# Patient Record
Sex: MALE | Race: WHITE | NOT HISPANIC OR LATINO | Employment: FULL TIME | URBAN - NONMETROPOLITAN AREA
[De-identification: names, ages, dates, MRNs, and addresses within clinical notes are randomized per-mention and may not be internally consistent; named-entity substitution may affect disease eponyms.]

---

## 2023-01-08 ENCOUNTER — APPOINTMENT (EMERGENCY)
Dept: RADIOLOGY | Facility: HOSPITAL | Age: 38
End: 2023-01-08

## 2023-01-08 ENCOUNTER — HOSPITAL ENCOUNTER (EMERGENCY)
Facility: HOSPITAL | Age: 38
Discharge: HOME | End: 2023-01-08
Attending: EMERGENCY MEDICINE

## 2023-01-08 ENCOUNTER — APPOINTMENT (EMERGENCY)
Dept: CT IMAGING | Facility: HOSPITAL | Age: 38
End: 2023-01-08

## 2023-01-08 VITALS
RESPIRATION RATE: 20 BRPM | HEART RATE: 100 BPM | DIASTOLIC BLOOD PRESSURE: 74 MMHG | TEMPERATURE: 96.2 F | SYSTOLIC BLOOD PRESSURE: 115 MMHG | OXYGEN SATURATION: 97 % | WEIGHT: 202.82 LBS

## 2023-01-08 DIAGNOSIS — R55 SYNCOPE: Primary | ICD-10-CM

## 2023-01-08 DIAGNOSIS — F10.10 ALCOHOL ABUSE: ICD-10-CM

## 2023-01-08 LAB
ALBUMIN SERPL BCP-MCNC: 3.9 G/DL (ref 3.5–5)
ALP SERPL-CCNC: 73 U/L (ref 46–116)
ALT SERPL W P-5'-P-CCNC: 127 U/L (ref 12–78)
AMPHETAMINES SERPL QL SCN: NEGATIVE
ANION GAP SERPL CALCULATED.3IONS-SCNC: 13 MMOL/L (ref 4–13)
APTT PPP: 21 SECONDS (ref 23–37)
AST SERPL W P-5'-P-CCNC: 51 U/L (ref 5–45)
ATRIAL RATE: 91 BPM
BARBITURATES UR QL: NEGATIVE
BASOPHILS # BLD AUTO: 0.07 THOUSANDS/ÂΜL (ref 0–0.1)
BASOPHILS NFR BLD AUTO: 1 % (ref 0–1)
BENZODIAZ UR QL: NEGATIVE
BILIRUB SERPL-MCNC: 0.53 MG/DL (ref 0.2–1)
BILIRUB UR QL STRIP: NEGATIVE
BUN SERPL-MCNC: 12 MG/DL (ref 5–25)
CALCIUM SERPL-MCNC: 8.3 MG/DL (ref 8.3–10.1)
CARDIAC TROPONIN I PNL SERPL HS: <2 NG/L
CHLORIDE SERPL-SCNC: 103 MMOL/L (ref 96–108)
CLARITY UR: CLEAR
CO2 SERPL-SCNC: 22 MMOL/L (ref 21–32)
COCAINE UR QL: NEGATIVE
COLOR UR: NORMAL
CREAT SERPL-MCNC: 1.28 MG/DL (ref 0.6–1.3)
D DIMER PPP FEU-MCNC: <0.27 UG/ML FEU
EOSINOPHIL # BLD AUTO: 0.09 THOUSAND/ÂΜL (ref 0–0.61)
EOSINOPHIL NFR BLD AUTO: 1 % (ref 0–6)
ERYTHROCYTE [DISTWIDTH] IN BLOOD BY AUTOMATED COUNT: 11.9 % (ref 11.6–15.1)
ETHANOL SERPL-MCNC: 153 MG/DL (ref 0–3)
FLUAV RNA RESP QL NAA+PROBE: NEGATIVE
FLUBV RNA RESP QL NAA+PROBE: NEGATIVE
GFR SERPL CREATININE-BSD FRML MDRD: 71 ML/MIN/1.73SQ M
GLUCOSE SERPL-MCNC: 144 MG/DL (ref 65–140)
GLUCOSE SERPL-MCNC: 162 MG/DL (ref 65–140)
GLUCOSE UR STRIP-MCNC: NEGATIVE MG/DL
HCT VFR BLD AUTO: 43.9 % (ref 36.5–49.3)
HGB BLD-MCNC: 15.5 G/DL (ref 12–17)
HGB UR QL STRIP.AUTO: NEGATIVE
IMM GRANULOCYTES # BLD AUTO: 0.1 THOUSAND/UL (ref 0–0.2)
IMM GRANULOCYTES NFR BLD AUTO: 1 % (ref 0–2)
INR PPP: 0.99 (ref 0.84–1.19)
KETONES UR STRIP-MCNC: NEGATIVE MG/DL
LACTATE SERPL-SCNC: 2.2 MMOL/L (ref 0.5–2)
LACTATE SERPL-SCNC: 3.2 MMOL/L (ref 0.5–2)
LEUKOCYTE ESTERASE UR QL STRIP: NEGATIVE
LIPASE SERPL-CCNC: 194 U/L (ref 73–393)
LYMPHOCYTES # BLD AUTO: 4.63 THOUSANDS/ÂΜL (ref 0.6–4.47)
LYMPHOCYTES NFR BLD AUTO: 47 % (ref 14–44)
MCH RBC QN AUTO: 32.4 PG (ref 26.8–34.3)
MCHC RBC AUTO-ENTMCNC: 35.3 G/DL (ref 31.4–37.4)
MCV RBC AUTO: 92 FL (ref 82–98)
METHADONE UR QL: NEGATIVE
MONOCYTES # BLD AUTO: 0.77 THOUSAND/ÂΜL (ref 0.17–1.22)
MONOCYTES NFR BLD AUTO: 8 % (ref 4–12)
NEUTROPHILS # BLD AUTO: 4 THOUSANDS/ÂΜL (ref 1.85–7.62)
NEUTS SEG NFR BLD AUTO: 42 % (ref 43–75)
NITRITE UR QL STRIP: NEGATIVE
NRBC BLD AUTO-RTO: 0 /100 WBCS
OPIATES UR QL SCN: NEGATIVE
OXYCODONE+OXYMORPHONE UR QL SCN: NEGATIVE
P AXIS: 33 DEGREES
PCP UR QL: NEGATIVE
PH UR STRIP.AUTO: 6 [PH]
PLATELET # BLD AUTO: 286 THOUSANDS/UL (ref 149–390)
PMV BLD AUTO: 8.8 FL (ref 8.9–12.7)
POTASSIUM SERPL-SCNC: 3.5 MMOL/L (ref 3.5–5.3)
PR INTERVAL: 166 MS
PROT SERPL-MCNC: 6.9 G/DL (ref 6.4–8.4)
PROT UR STRIP-MCNC: NEGATIVE MG/DL
PROTHROMBIN TIME: 13.2 SECONDS (ref 11.6–14.5)
QRS AXIS: 58 DEGREES
QRSD INTERVAL: 92 MS
QT INTERVAL: 372 MS
QTC INTERVAL: 457 MS
RBC # BLD AUTO: 4.79 MILLION/UL (ref 3.88–5.62)
RSV RNA RESP QL NAA+PROBE: NEGATIVE
SARS-COV-2 RNA RESP QL NAA+PROBE: NEGATIVE
SODIUM SERPL-SCNC: 138 MMOL/L (ref 135–147)
SP GR UR STRIP.AUTO: <1.005 (ref 1–1.03)
T WAVE AXIS: 28 DEGREES
THC UR QL: POSITIVE
UROBILINOGEN UR QL STRIP.AUTO: 0.2 E.U./DL
VENTRICULAR RATE: 91 BPM
WBC # BLD AUTO: 9.66 THOUSAND/UL (ref 4.31–10.16)

## 2023-01-08 RX ADMIN — SODIUM CHLORIDE 1000 ML: 0.9 INJECTION, SOLUTION INTRAVENOUS at 19:16

## 2023-01-08 RX ADMIN — SODIUM CHLORIDE 1000 ML: 0.9 INJECTION, SOLUTION INTRAVENOUS at 20:19

## 2023-01-08 NOTE — Clinical Note
Mechelle Fung was seen and treated in our emergency department on 1/8/2023  Diagnosis:     Tammy Vo  may return to work on return date  He may return on this date: 01/10/2023         If you have any questions or concerns, please don't hesitate to call        Mai Clark MD    ______________________________           _______________          _______________  Hospital Representative                              Date                                Time

## 2023-01-09 NOTE — ED PROVIDER NOTES
History  Chief Complaint   Patient presents with   • Syncope     Pt reports drinking an unknown quantity of alcohol this afternoon  Witnessed syncopal event at the dinner table tonight  Lethargic, denies pain or injury      Brought in by ambulance  Had a syncopal episode  Admits to drinking beer today  States he only had 3 or 4  Admits to marijuana use  Feels like everything is heavy throughout his body  States he was at the dinner table when he suddenly got nauseated  Got warm and sweaty  Had a brief syncopal episode  No loss of bladder or bowel functions  No headaches  No chest pain  No shortness of breath  No abdominal pain or back pain  No palpitations   used: No    Syncope  Episode history:  Single  Most recent episode: Today  Timing:  Constant  Progression:  Resolved  Chronicity:  New  Context: sitting down    Context: not bowel movement, not dehydration and not medication change    Relieved by:  Nothing  Worsened by:  Nothing  Ineffective treatments:  None tried  Associated symptoms: nausea and weakness    Associated symptoms: no anxiety, no chest pain, no difficulty breathing, no dizziness, no fever, no headaches, no palpitations, no recent fall, no rectal bleeding, no seizures, no shortness of breath, no visual change and no vomiting        None       History reviewed  No pertinent past medical history  History reviewed  No pertinent surgical history  History reviewed  No pertinent family history  I have reviewed and agree with the history as documented  E-Cigarette/Vaping     E-Cigarette/Vaping Substances     Social History     Tobacco Use   • Smoking status: Some Days     Types: Cigars   • Smokeless tobacco: Never   Substance Use Topics   • Alcohol use: Yes   • Drug use: Yes     Types: Marijuana       Review of Systems   Constitutional: Negative for chills and fever     HENT: Negative for ear pain, hearing loss, sore throat, trouble swallowing and voice change  Eyes: Negative for pain and discharge  Respiratory: Negative for cough, shortness of breath and wheezing  Cardiovascular: Positive for syncope  Negative for chest pain and palpitations  Gastrointestinal: Positive for nausea  Negative for abdominal pain, blood in stool, constipation, diarrhea and vomiting  Genitourinary: Negative for dysuria, flank pain, frequency and hematuria  Musculoskeletal: Negative for joint swelling, neck pain and neck stiffness  Skin: Negative for rash and wound  Neurological: Positive for weakness  Negative for dizziness, seizures, syncope, facial asymmetry and headaches  Psychiatric/Behavioral: Negative for hallucinations, self-injury and suicidal ideas  All other systems reviewed and are negative  Physical Exam  Physical Exam  Vitals and nursing note reviewed  Constitutional:       General: He is not in acute distress  Appearance: He is well-developed  HENT:      Head: Normocephalic and atraumatic  Right Ear: External ear normal       Left Ear: External ear normal    Eyes:      General: No scleral icterus  Right eye: No discharge  Left eye: No discharge  Extraocular Movements: Extraocular movements intact  Conjunctiva/sclera: Conjunctivae normal    Cardiovascular:      Rate and Rhythm: Normal rate and regular rhythm  Heart sounds: Normal heart sounds  No murmur heard  Pulmonary:      Effort: Pulmonary effort is normal       Breath sounds: Normal breath sounds  No wheezing or rales  Abdominal:      General: Bowel sounds are normal  There is no distension  Palpations: Abdomen is soft  Tenderness: There is no abdominal tenderness  There is no guarding or rebound  Musculoskeletal:         General: No deformity  Normal range of motion  Cervical back: Normal range of motion and neck supple  Skin:     General: Skin is warm and dry  Findings: No rash     Neurological:      General: No focal deficit present  Mental Status: He is alert and oriented to person, place, and time  Cranial Nerves: No cranial nerve deficit  Psychiatric:         Mood and Affect: Mood normal          Behavior: Behavior normal          Thought Content: Thought content normal          Judgment: Judgment normal          Vital Signs  ED Triage Vitals   Temperature Pulse Respirations Blood Pressure SpO2   01/08/23 1903 01/08/23 1906 01/08/23 1907 01/08/23 1903 01/08/23 1906   (!) 96 2 °F (35 7 °C) 93 14 116/73 94 %      Temp Source Heart Rate Source Patient Position - Orthostatic VS BP Location FiO2 (%)   01/08/23 1903 -- 01/08/23 1903 01/08/23 1903 --   Temporal  Lying Right arm       Pain Score       01/08/23 1903       No Pain           Vitals:    01/08/23 1903 01/08/23 1906 01/08/23 2000 01/08/23 2100   BP: 116/73  104/62 114/73   Pulse:  93 87 98   Patient Position - Orthostatic VS: Lying  Lying Lying         Visual Acuity  Visual Acuity    Flowsheet Row Most Recent Value   L Pupil Size (mm) 3   R Pupil Size (mm) 3          ED Medications  Medications   sodium chloride 0 9 % bolus 1,000 mL (0 mL Intravenous Stopped 1/8/23 2018)   sodium chloride 0 9 % bolus 1,000 mL (1,000 mL Intravenous New Bag 1/8/23 2019)       Diagnostic Studies  Results Reviewed     Procedure Component Value Units Date/Time    Lactic acid 2 Hours [[id number]]  (Abnormal) Collected: 01/08/23 2133    Lab Status: Final result Specimen: Blood from Arm, Left Updated: 01/08/23 2208     LACTIC ACID 2 2 mmol/L     Narrative:      Result may be elevated if tourniquet was used during collection      Rapid drug screen, urine [[id number]]  (Abnormal) Collected: 01/08/23 2133    Lab Status: Final result Specimen: Urine, Clean Catch Updated: 01/08/23 2200     Amph/Meth UR Negative     Barbiturate Ur Negative     Benzodiazepine Urine Negative     Cocaine Urine Negative     Methadone Urine Negative     Opiate Urine Negative     PCP Ur Negative     THC Urine Positive     Oxycodone Urine Negative    Narrative:      Presumptive report  If requested, specimen will be sent to reference lab for confirmation  FOR MEDICAL PURPOSES ONLY  IF CONFIRMATION NEEDED PLEASE CONTACT THE LAB WITHIN 5 DAYS      Drug Screen Cutoff Levels:  AMPHETAMINE/METHAMPHETAMINES  1000 ng/mL  BARBITURATES     200 ng/mL  BENZODIAZEPINES     200 ng/mL  COCAINE      300 ng/mL  METHADONE      300 ng/mL  OPIATES      300 ng/mL  PHENCYCLIDINE     25 ng/mL  THC       50 ng/mL  OXYCODONE      100 ng/mL    UA w Reflex to Microscopic w Reflex to Culture [[id number]] Collected: 01/08/23 2134    Lab Status: No result Specimen: Urine, Clean Catch     Comprehensive metabolic panel [[id number]]  (Abnormal) Collected: 01/08/23 1912    Lab Status: Final result Specimen: Blood from Arm, Left Updated: 01/08/23 2020     Sodium 138 mmol/L      Potassium 3 5 mmol/L      Chloride 103 mmol/L      CO2 22 mmol/L      ANION GAP 13 mmol/L      BUN 12 mg/dL      Creatinine 1 28 mg/dL      Glucose 162 mg/dL      Calcium 8 3 mg/dL      AST 51 U/L       U/L      Alkaline Phosphatase 73 U/L      Total Protein 6 9 g/dL      Albumin 3 9 g/dL      Total Bilirubin 0 53 mg/dL      eGFR 71 ml/min/1 73sq m     Narrative:      [place] guidelines for Chronic Kidney Disease (CKD):   •  Stage 1 with normal or high GFR (GFR > 90 mL/min/1 73 square meters)  •  Stage 2 Mild CKD (GFR = 60-89 mL/min/1 73 square meters)  •  Stage 3A Moderate CKD (GFR = 45-59 mL/min/1 73 square meters)  •  Stage 3B Moderate CKD (GFR = 30-44 mL/min/1 73 square meters)  •  Stage 4 Severe CKD (GFR = 15-29 mL/min/1 73 square meters)  •  Stage 5 End Stage CKD (GFR <15 mL/min/1 73 square meters)  Note: GFR calculation is accurate only with a steady state creatinine    Lipase [[id number]]  (Normal) Collected: 01/08/23 1912    Lab Status: Final result Specimen: Blood from Arm, Left Updated: 01/08/23 2020     Lipase 194 u/L     Ethanol [[id number]]  (Abnormal) Collected: 01/08/23 1912    Lab Status: Final result Specimen: Blood from Arm, Left Updated: 01/08/23 2020     Ethanol Lvl 153 mg/dL     FLU/RSV/COVID - if FLU/RSV clinically relevant [[id number]]  (Normal) Collected: 01/08/23 1912    Lab Status: Final result Specimen: Nares from Nose Updated: 01/08/23 2003     SARS-CoV-2 Negative     INFLUENZA A PCR Negative     INFLUENZA B PCR Negative     RSV PCR Negative    Narrative:      FOR PEDIATRIC PATIENTS - copy/paste COVID Guidelines URL to browser: [email or web address]  ticcklex    SARS-CoV-2 assay is a Nucleic Acid Amplification assay intended for the  qualitative detection of nucleic acid from SARS-CoV-2 in nasopharyngeal  swabs  Results are for the presumptive identification of SARS-CoV-2 RNA  Positive results are indicative of infection with SARS-CoV-2, the virus  causing COVID-19, but do not rule out bacterial infection or co-infection  with other viruses  Laboratories within the [place] and its  territories are required to report all positive results to the appropriate  public health authorities  Negative results do not preclude SARS-CoV-2  infection and should not be used as the sole basis for treatment or other  patient management decisions  Negative results must be combined with  clinical observations, patient history, and epidemiological information  This test has not been FDA cleared or approved  This test has been authorized by FDA under an Emergency Use Authorization  (EUA)  This test is only authorized for the duration of time the  declaration that circumstances exist justifying the authorization of the  emergency use of an in vitro diagnostic tests for detection of SARS-CoV-2  virus and/or diagnosis of COVID-19 infection under section 564(b)(1) of  the Act, 21 U  S C  517CNJ-8(F)(3), unless the authorization is terminated  or revoked sooner   The test has been validated but independent review by FDA  and CLIA is pending  Test performed using [employer] GeneXpert: This RT-PCR assay targets N2,  a region unique to SARS-CoV-2  A conserved region in the E-gene was chosen  for pan-Sarbecovirus detection which includes SARS-CoV-2  According to CMS-2020-01-R, this platform meets the definition of high-throughput technology  D-Dimer [[id number]]  (Normal) Collected: 01/08/23 1912    Lab Status: Final result Specimen: Blood from Arm, Left Updated: 01/08/23 2001     D-Dimer, Quant <0 27 ug/ml FEU     Protime-INR [[id number]]  (Normal) Collected: 01/08/23 1912    Lab Status: Final result Specimen: Blood from Arm, Left Updated: 01/08/23 1959     Protime 13 2 seconds      INR 0 99    APTT [[id number]]  (Abnormal) Collected: 01/08/23 1912    Lab Status: Final result Specimen: Blood from Arm, Left Updated: 01/08/23 1959     PTT 21 seconds     Lactic acid [[id number]]  (Abnormal) Collected: 01/08/23 1912    Lab Status: Final result Specimen: Blood from Arm, Left Updated: 01/08/23 1954     LACTIC ACID 3 2 mmol/L     Narrative:      Result may be elevated if tourniquet was used during collection      HS Troponin 0hr (reflex protocol) [[id number]]  (Normal) Collected: 01/08/23 1912    Lab Status: Final result Specimen: Blood from Arm, Left Updated: 01/08/23 1949     hs TnI 0hr <2 ng/L     CBC and differential [[id number]]  (Abnormal) Collected: 01/08/23 1912    Lab Status: Final result Specimen: Blood from Arm, Left Updated: 01/08/23 1922     WBC 9 66 Thousand/uL      RBC 4 79 Million/uL      Hemoglobin 15 5 g/dL      Hematocrit 43 9 %      MCV 92 fL      MCH 32 4 pg      MCHC 35 3 g/dL      RDW 11 9 %      MPV 8 8 fL      Platelets 847 Thousands/uL      nRBC 0 /100 WBCs      Neutrophils Relative 42 %      Immat GRANS % 1 %      Lymphocytes Relative 47 %      Monocytes Relative 8 %      Eosinophils Relative 1 %      Basophils Relative 1 %      Neutrophils Absolute 4 00 Thousands/µL      Immature Grans Absolute 0 10 Thousand/uL      Lymphocytes Absolute 4 63 Thousands/µL      Monocytes Absolute 0 77 Thousand/µL      Eosinophils Absolute 0 09 Thousand/µL      Basophils Absolute 0 07 Thousands/µL     Fingerstick Glucose (POCT) [[id number]]  (Abnormal) Collected: 01/08/23 1905    Lab Status: Final result Updated: 01/08/23 1911     POC Glucose 144 mg/dl                  CT head without contrast   Final Result by Reyes Snyder MD (01/08 2215)      No acute intracranial hemorrhage, midline shift, or mass effect  Workstation performed: [id number]         XR chest 1 view portable   ED Interpretation by Emiliano Larry MD (01/08 1928)   NAD                 Procedures  ECG 12 Lead Documentation Only    Date/Time: 1/8/2023 7:09 PM  Performed by: Emiliano Larry MD  Authorized by: Emiliano Larry MD     ECG reviewed by me, the ED Provider: yes    Patient location:  ED  Previous ECG:     Previous ECG:  Unavailable  Rate:     ECG rate:  90  Rhythm:     Rhythm: sinus rhythm    Ectopy:     Ectopy: none    QRS:     QRS axis:  Normal             ED Course  ED Course as of 01/08/23 2217   [facility]   2130 LACTIC ACID(!!): 3 2  Could be secondary to alcohol and marijuana use  2216 Patient is awake alert  Neurologic exam is nonfocal    2217 Lactic acid has come down to 2 2 after IV fluids  SBIRT 20yo+    Flowsheet Row Most Recent Value   SBIRT (25 yo +)    In order to provide better care to our patients, we are screening all of our patients for alcohol and drug use  Would it be okay to ask you these screening questions? Unable to answer at this time Filed at: 01/08/2023 1933                    Medical Decision Making  Alcohol abuse: acute illness or injury  Syncope: acute illness or injury  Amount and/or Complexity of Data Reviewed  Independent Historian: EMS  External Data Reviewed: labs, radiology and ECG  Labs: ordered  Decision-making details documented in ED Course    Radiology: ordered and independent interpretation performed  Decision-making details documented in ED Course  ECG/medicine tests: ordered and independent interpretation performed  Decision-making details documented in ED Course  Risk  OTC drugs  Prescription drug management  Decision regarding hospitalization  Disposition  Final diagnoses:   Syncope   Alcohol abuse     Time reflects when diagnosis was documented in both MDM as applicable and the Disposition within this note     Time User Action Codes Description Comment    1/8/2023  8:38 PM Earmerissa Lutz Add [R55] Syncope     1/8/2023  8:38 PM Rabia Bower Add [F10 10] Alcohol abuse       ED Disposition     ED Disposition   Discharge    Condition   Stable    Date/Time   Sun Jan 8, 2023 10:10 PM    Comment   Rome Guess discharge to home/self care  Follow-up Information     Follow up With Specialties Details Why Contact Info    SIRI Cunningham Nurse Practitioner Call in 1 day  [facility] [address]  [phone]            Patient's Medications    No medications on file       No discharge procedures on file      PDMP Review     None          ED Provider  Electronically Signed by           Ele Andrade MD  01/08/23 2219       Ele Andrade MD  01/09/23 9974

## 2024-10-19 ENCOUNTER — HOSPITAL ENCOUNTER (EMERGENCY)
Facility: HOSPITAL | Age: 39
Discharge: HOME | End: 2024-10-19
Attending: STUDENT IN AN ORGANIZED HEALTH CARE EDUCATION/TRAINING PROGRAM
Payer: COMMERCIAL

## 2024-10-19 ENCOUNTER — APPOINTMENT (EMERGENCY)
Dept: RADIOLOGY | Facility: HOSPITAL | Age: 39
End: 2024-10-19
Payer: COMMERCIAL

## 2024-10-19 VITALS
DIASTOLIC BLOOD PRESSURE: 89 MMHG | TEMPERATURE: 98.5 F | HEART RATE: 89 BPM | SYSTOLIC BLOOD PRESSURE: 152 MMHG | RESPIRATION RATE: 20 BRPM | OXYGEN SATURATION: 96 % | WEIGHT: 190 LBS

## 2024-10-19 DIAGNOSIS — S82.871A CLOSED DISPLACED PILON FRACTURE OF RIGHT TIBIA, INITIAL ENCOUNTER: Primary | ICD-10-CM

## 2024-10-19 PROCEDURE — 73590 X-RAY EXAM OF LOWER LEG: CPT

## 2024-10-19 PROCEDURE — 96375 TX/PRO/DX INJ NEW DRUG ADDON: CPT

## 2024-10-19 PROCEDURE — 99283 EMERGENCY DEPT VISIT LOW MDM: CPT

## 2024-10-19 PROCEDURE — 99285 EMERGENCY DEPT VISIT HI MDM: CPT | Performed by: STUDENT IN AN ORGANIZED HEALTH CARE EDUCATION/TRAINING PROGRAM

## 2024-10-19 PROCEDURE — 73610 X-RAY EXAM OF ANKLE: CPT

## 2024-10-19 PROCEDURE — 96374 THER/PROPH/DIAG INJ IV PUSH: CPT

## 2024-10-19 PROCEDURE — 29515 APPLICATION SHORT LEG SPLINT: CPT | Performed by: STUDENT IN AN ORGANIZED HEALTH CARE EDUCATION/TRAINING PROGRAM

## 2024-10-19 RX ORDER — FENTANYL CITRATE 50 UG/ML
50 INJECTION, SOLUTION INTRAMUSCULAR; INTRAVENOUS ONCE
Status: COMPLETED | OUTPATIENT
Start: 2024-10-19 | End: 2024-10-19

## 2024-10-19 RX ORDER — OXYCODONE HYDROCHLORIDE 5 MG/1
5 TABLET ORAL EVERY 6 HOURS PRN
Qty: 12 TABLET | Refills: 0 | Status: SHIPPED | OUTPATIENT
Start: 2024-10-19

## 2024-10-19 RX ORDER — HYDROMORPHONE HCL/PF 1 MG/ML
1 SYRINGE (ML) INJECTION ONCE
Status: COMPLETED | OUTPATIENT
Start: 2024-10-19 | End: 2024-10-19

## 2024-10-19 RX ADMIN — HYDROMORPHONE HYDROCHLORIDE 1 MG: 1 INJECTION, SOLUTION INTRAMUSCULAR; INTRAVENOUS; SUBCUTANEOUS at 14:28

## 2024-10-19 RX ADMIN — FENTANYL CITRATE 50 MCG: 50 INJECTION INTRAMUSCULAR; INTRAVENOUS at 15:06

## 2024-10-19 NOTE — DISCHARGE INSTRUCTIONS
Keep the splint applied/dry until you follow-up with orthopedics.    For pain, you can take ibuprofen 600 mg every 6 hours and Tylenol 1000 mg every 6 hours.  You are being prescribed a course of oxycodone as needed for breakthrough pain.    Use the provided crutches to help ambulate.    Keep the right lower leg elevated to help reduce pain/swelling.  An outpatient referral to orthopedics was provided.  Call the provided number to set up the appointment.    Return to the emergency department for any concerning signs or symptoms.

## 2024-10-19 NOTE — ED PROVIDER NOTES
Time reflects when diagnosis was documented in both MDM as applicable and the Disposition within this note       Time User Action Codes Description Comment    10/19/2024  3:26 PM Malachi Brito Add [S82.871A] Closed displaced pilon fracture of right tibia, initial encounter           ED Disposition       ED Disposition   Discharge    Condition   Stable    Date/Time   Sat Oct 19, 2024  3:24 PM    Comment   Amarjit Pappas discharge to home/self care.                   Assessment & Plan       Medical Decision Making  This patient presents with right ankle pain/swelling status post fall off a roof.   Diagnostic considerations include bimalleolar fracture, trimalleolar fracture, ankle sprain, compression fractures, femur fracture, pelvic fracture, ICH, cervical spine fracture.     40 yo M. Presents s/p fall off a roof. Isolated injury to the right lower leg. GCS 15. No other signs of external trauma. The patient has bilateral breath sounds. No signs of respiratory distress. Abdominal exam benign. No pain along the lower back. XR imaging +comminuted right distal tibia fracture. The distal aspect of the RLE is NVI. The case was discussed with Orthopedics (Dr. Gibbons)--posterior splint, OP follow recommended. See procedural note. Crutches provided. PRN oxycodone. OP Ortho referral ordered.  Recommendation/return precautions discussed.  Stable for discharge.        Problems Addressed:  Closed displaced pilon fracture of right tibia, initial encounter: acute illness or injury    Amount and/or Complexity of Data Reviewed  Radiology: ordered and independent interpretation performed.     Details: Comminuted right distal tibia fracture  Discussion of management or test interpretation with external provider(s): The case and treatment plan were discussed with Orthopedics (Dr. Gibbons).     Risk  Prescription drug management.  Parenteral controlled substances.      Medications   HYDROmorphone (DILAUDID) injection 1 mg (1 mg Intravenous  Given 10/19/24 1428)   fentaNYL injection 50 mcg (50 mcg Intravenous Given 10/19/24 1506)       ED Risk Strat Scores           SBIRT 20yo+      Flowsheet Row Most Recent Value   Initial Alcohol Screen: US AUDIT-C     1. How often do you have a drink containing alcohol? 0 Filed at: 10/19/2024 1410   2. How many drinks containing alcohol do you have on a typical day you are drinking?  0 Filed at: 10/19/2024 1410   3a. Male UNDER 65: How often do you have five or more drinks on one occasion? 0 Filed at: 10/19/2024 1410   Audit-C Score 0 Filed at: 10/19/2024 1410   NANCY: How many times in the past year have you...    Used an illegal drug or used a prescription medication for non-medical reasons? Never Filed at: 10/19/2024 1410          History of Present Illness       Chief Complaint   Patient presents with    Fall     Fell 10 ft off a roof, landed on feet. Denies head strike denies blood thinner use. R ankle deformity noted. Distal pms intact GCS 15. Denies back, hip/pelvis pain. Denies numbness/tingling in extremities.       History reviewed. No pertinent past medical history.   History reviewed. No pertinent surgical history.   History reviewed. No pertinent family history.   Social History     Tobacco Use    Smoking status: Some Days     Types: Cigars    Smokeless tobacco: Never   Vaping Use    Vaping status: Never Used   Substance Use Topics    Alcohol use: Yes    Drug use: Yes     Types: Marijuana      E-Cigarette/Vaping    E-Cigarette Use Never User       E-Cigarette/Vaping Substances      I have reviewed and agree with the history as documented.       History provided by:  Patient  Fall  Mechanism of injury: fall    Mechanism of injury comment:  Patient states that he fell off a 10 ft roof this PM. Expressing right lower leg/ankle pain. Denies all other injuries.  Injury location:  Leg  Leg injury location:  R lower leg and L ankle  Time since incident:  1 hour  Arrived directly from scene: yes    Prior to  arrival data:     Patient ambulatory at scene: no      Responsiveness at scene:  Alert    Orientation at scene:  Person, place, situation and time    Loss of consciousness: no      Amnesic to event: no    Associated symptoms: no abdominal pain, no back pain, no chest pain, no difficulty breathing, no headaches, no loss of consciousness, no nausea, no neck pain and no vomiting      Review of Systems   Eyes:  Negative for photophobia, redness and visual disturbance.   Respiratory:  Negative for cough, chest tightness, shortness of breath and wheezing.    Cardiovascular:  Negative for chest pain and palpitations.   Gastrointestinal:  Negative for abdominal pain, nausea and vomiting.   Musculoskeletal:  Positive for arthralgias, gait problem and joint swelling. Negative for back pain, neck pain and neck stiffness.   Skin:  Negative for color change, pallor, rash and wound.   Neurological:  Negative for dizziness, loss of consciousness, syncope, speech difficulty, weakness, light-headedness and headaches.   Psychiatric/Behavioral:  Negative for confusion and decreased concentration.    All other systems reviewed and are negative.    Objective       ED Triage Vitals [10/19/24 1410]   Temperature Pulse Blood Pressure Respirations SpO2 Patient Position - Orthostatic VS   98.5 °F (36.9 °C) 79 (!) 145/112 20 100 % Sitting      Temp src Heart Rate Source BP Location FiO2 (%) Pain Score    -- Monitor Right arm -- 9      Vitals      Date and Time Temp Pulse SpO2 Resp BP Pain Score FACES Pain Rating User   10/19/24 1536 -- -- -- -- -- 3 -- MB   10/19/24 1515 -- 89 96 % -- 152/89 -- -- MB   10/19/24 1506 -- -- -- -- -- 4 -- MB   10/19/24 1500 -- 87 99 % -- 126/84 -- -- MB   10/19/24 1458 -- -- -- -- -- 4 -- MB   10/19/24 1445 -- 85 97 % -- 121/83 -- -- MB   10/19/24 1430 -- 85 99 % -- 134/103 -- -- MB   10/19/24 1428 -- -- -- -- -- 8 -- MB   10/19/24 1410 98.5 °F (36.9 °C) 79 100 % 20 145/112 9 -- KK          Physical Exam  Vitals  and nursing note reviewed.   Constitutional:       General: He is in acute distress.      Appearance: He is not ill-appearing or toxic-appearing.   HENT:      Head: Normocephalic and atraumatic.      Right Ear: Tympanic membrane, ear canal and external ear normal.      Left Ear: Tympanic membrane, ear canal and external ear normal.      Nose: No congestion or rhinorrhea.   Eyes:      General: No scleral icterus.        Right eye: No discharge.         Left eye: No discharge.      Extraocular Movements: Extraocular movements intact.      Conjunctiva/sclera: Conjunctivae normal.      Pupils: Pupils are equal, round, and reactive to light.   Cardiovascular:      Rate and Rhythm: Normal rate and regular rhythm.      Pulses: Normal pulses.      Heart sounds: Normal heart sounds. No murmur heard.  Pulmonary:      Effort: Pulmonary effort is normal. No respiratory distress.      Breath sounds: Normal breath sounds. No wheezing or rhonchi.   Chest:      Chest wall: No tenderness.   Abdominal:      General: Bowel sounds are normal.      Palpations: Abdomen is soft.      Tenderness: There is no abdominal tenderness. There is no right CVA tenderness, left CVA tenderness, guarding or rebound.   Musculoskeletal:         General: Swelling, tenderness, deformity and signs of injury present.      Cervical back: Normal range of motion and neck supple. No tenderness.   Skin:     General: Skin is warm and dry.      Coloration: Skin is not jaundiced.      Findings: No bruising or erythema.   Neurological:      General: No focal deficit present.      Mental Status: He is alert and oriented to person, place, and time.   Psychiatric:         Mood and Affect: Mood normal.         Behavior: Behavior normal.       Results Reviewed       None            XR ankle 3+ views RIGHT   ED Interpretation by Malachi Brito DO (10/19 3153)   Comminuted right distal tibia fracture      XR tibia fibula 2 views RIGHT   ED Interpretation by Malachi JUAN  DO Daryl (10/19 1441)   Comminuted right distal tibia fracture          Splint application    Date/Time: 10/19/2024 3:20 PM    Performed by: Malachi Brito DO  Authorized by: Malachi Brito DO  [place] Protocol:  procedure performed by consultantConsent: Verbal consent obtained.  Consent given by: patient    Pre-procedure details:     Sensation:  Normal    Skin color:  Pink  Procedure details:     Laterality:  Right    Location:  Ankle    Ankle:  R ankle    Splint type:  Short leg    Supplies:  Cotton padding, elastic bandage and Ortho-Glass  Post-procedure details:     Pain:  Improved    Sensation:  Normal    Skin color:  Pink    Patient tolerance of procedure:  Tolerated well, no immediate complications    ED Medication and Procedure Management   None     Patient's Medications   Discharge Prescriptions    OXYCODONE (ROXICODONE) 5 IMMEDIATE RELEASE TABLET    Take 1 tablet (5 mg total) by mouth every 6 (six) hours as needed for severe pain Max Daily Amount: 20 mg       Start Date: 10/19/2024End Date: --       Order Dose: 5 mg       Quantity: 12 tablet    Refills: 0       ED SEPSIS DOCUMENTATION   Time reflects when diagnosis was documented in both MDM as applicable and the Disposition within this note       Time User Action Codes Description Comment    10/19/2024  3:26 PM Malachi Brito Add [S82.871A] Closed displaced pilon fracture of right tibia, initial encounter                  Malachi Brito DO  10/19/24 1542

## 2024-10-21 ENCOUNTER — HOSPITAL ENCOUNTER (OUTPATIENT)
Dept: CT IMAGING | Facility: HOSPITAL | Age: 39
Discharge: HOME | End: 2024-10-21
Attending: ORTHOPAEDIC SURGERY
Payer: COMMERCIAL

## 2024-10-21 VITALS
TEMPERATURE: 97.6 F | SYSTOLIC BLOOD PRESSURE: 146 MMHG | HEART RATE: 75 BPM | DIASTOLIC BLOOD PRESSURE: 92 MMHG | OXYGEN SATURATION: 97 %

## 2024-10-21 DIAGNOSIS — S82.871A CLOSED DISPLACED PILON FRACTURE OF RIGHT TIBIA, INITIAL ENCOUNTER: ICD-10-CM

## 2024-10-21 DIAGNOSIS — M25.571 PAIN, JOINT, ANKLE AND FOOT, RIGHT: Primary | ICD-10-CM

## 2024-10-21 PROCEDURE — 73700 CT LOWER EXTREMITY W/O DYE: CPT

## 2024-10-21 PROCEDURE — 99204 OFFICE O/P NEW MOD 45 MIN: CPT | Performed by: ORTHOPAEDIC SURGERY

## 2024-10-21 RX ORDER — IBUPROFEN 200 MG
200 TABLET ORAL
COMMUNITY
Start: 2024-07-26

## 2024-10-21 RX ORDER — ACETAMINOPHEN 500 MG
500 TABLET ORAL EVERY 6 HOURS PRN
COMMUNITY

## 2024-10-21 RX ORDER — OXYCODONE HYDROCHLORIDE 5 MG/1
5-10 TABLET ORAL EVERY 6 HOURS PRN
Qty: 30 TABLET | Refills: 0 | Status: SHIPPED | OUTPATIENT
Start: 2024-10-21

## 2024-10-21 NOTE — TELEPHONE ENCOUNTER
Hello,    Please advise if a forced appointment can be accommodated for the patient:    Call back #: [phone]    Insurance: [employer]    Reason for appointment: Patient was seen in ED yesterday 10/20 and 10/19 for Closed displaced pilon fracture of right tibia. Soonest available apt was too far out. Please advise Lizbeth with a sooner apt for the patient - [facility].    Requested doctor and/or location:   Antoinette MARSHALL (preferred)  Alexis MARSHALL (preferred)  Jesica AND / EAS  Lachman QUAK / AND      Thank you.

## 2024-10-21 NOTE — TELEPHONE ENCOUNTER
Spoke to Amarjit and his wife names of orthopedic trauma surgeons given along with phone number. Wife will call to make an appointment depending on their schedule.

## 2024-10-21 NOTE — TELEPHONE ENCOUNTER
----- Message from Colton Abad DO sent at 10/21/2024  4:08 PM EDT -----  Could 1 of you please contact this patient and help him get scheduled to see one of our orthopedic trauma surgeons for further treatment of his ankle.  Thank you  Dr. Brogle Dr. Ramski Dr. Nwachuku Dr. Lachman

## 2024-10-21 NOTE — PROGRESS NOTES
ASSESSMENT/PLAN:    Diagnoses and all orders for this visit:    Pain, joint, ankle and foot, right    Closed displaced pilon fracture of right tibia, initial encounter  -     Ambulatory referral to Orthopedic Surgery  -     CT lower extremity wo contrast right; Future  -     oxyCODONE (Roxicodone) 5 immediate release tablet; Take 1-2 tablets (5-10 mg total) by mouth every 6 (six) hours as needed for moderate pain or severe pain Max Daily Amount: 40 mg    Other orders  -     omeprazole (PriLOSEC) 20 mg delayed release capsule; Take 20 mg by mouth daily  -     ibuprofen (MOTRIN) 200 mg tablet; Take 200 mg by mouth  -     acetaminophen (TYLENOL) 500 mg tablet; Take 500 mg by mouth every 6 (six) hours as needed for mild pain        Plan: I have ordered a CT scan of the fracture to assess the anatomy of the fracture for surgical planning.  He has been referred to orthopedic trauma surgery for definitive treatment.  In the interim, he is to continue nonweightbearing and minimize activity.  He was provided with a prescription for oxycodone and instructed to take Tylenol 1000 mg every 8 hours routinely and oxycodone as needed.  He is to discontinue use of ibuprofen in preparation for surgery.  He is to contact me if any questions or concerns arise.    Return for Will call.      _____________________________________________________  CHIEF COMPLAINT:  Chief Complaint   Patient presents with    Right Leg - Fracture         SUBJECTIVE:  Amarjit Pappas is a 39 y.o. year old male who presents for evaluation of his right ankle injured when he fell from his roof at home on 10/19/2024.  He states he was painting the roof, stepped into the wet paint and slipped falling about 10 feet onto the right lower extremity.  He was seen in the emergency room at [facility] where he was evaluated, x-rays were obtained and he was splinted and then subsequently discharged and instructed to seek orthopedic follow-up.  Due to significant  pain, he was seen in the emergency room at [facility] on 10/20/2024, once again discharged and told to seek orthopedic follow-up.  He denies previous history of injury and denies any additional injuries.  He does complain of some tingling on the plantar aspect of his toes.  He denies any chest pain, he denies proximal pain in the calf or lower leg.    PAST MEDICAL HISTORY:  History reviewed. No pertinent past medical history.    PAST SURGICAL HISTORY:  History reviewed. No pertinent surgical history.    FAMILY HISTORY:  History reviewed. No pertinent family history.    SOCIAL HISTORY:  Social History     Tobacco Use    Smoking status: Former     Types: Cigars    Smokeless tobacco: Never   Vaping Use    Vaping status: Never Used   Substance Use Topics    Alcohol use: Yes     Comment: Socially    Drug use: Yes     Types: Marijuana       MEDICATIONS:    Current Outpatient Medications:     acetaminophen (TYLENOL) 500 mg tablet, Take 500 mg by mouth every 6 (six) hours as needed for mild pain, Disp: , Rfl:     ibuprofen (MOTRIN) 200 mg tablet, Take 200 mg by mouth, Disp: , Rfl:     omeprazole (PriLOSEC) 20 mg delayed release capsule, Take 20 mg by mouth daily, Disp: , Rfl:     oxyCODONE (Roxicodone) 5 immediate release tablet, Take 1 tablet (5 mg total) by mouth every 6 (six) hours as needed for severe pain Max Daily Amount: 20 mg, Disp: 12 tablet, Rfl: 0    oxyCODONE (Roxicodone) 5 immediate release tablet, Take 1-2 tablets (5-10 mg total) by mouth every 6 (six) hours as needed for moderate pain or severe pain Max Daily Amount: 40 mg, Disp: 30 tablet, Rfl: 0    ALLERGIES:  Allergies   Allergen Reactions    Celery Oil - Food Allergy Other (See Comments)       Review of systems:   Constitutional: Negative for fatigue, fever or loss of apetite.   HENT: Negative.    Respiratory: Negative for shortness of breath, dyspnea.    Cardiovascular: Negative for chest pain/tightness.   Gastrointestinal: Negative for abdominal  pain, N/V.   Endocrine: Negative for cold/heat intolerance, unexplained weight loss/gain.   Genitourinary: Negative for flank pain, dysuria, hematuria.   Musculoskeletal: Positive as in the HPI  Skin: Negative for rash.    Neurological: Positive as in the HPI  Psychiatric/Behavioral: Negative for agitation.  _____________________________________________________  PHYSICAL EXAMINATION:    Blood pressure 146/92, pulse 75, temperature 97.6 °F (36.4 °C), temperature source Temporal, SpO2 97%.    General: well developed and well nourished, alert, oriented times 3, and appears comfortable  Psychiatric: Normal  HEENT: Benign  Cardiovascular: Regular    Pulmonary: No wheezing or stridor  Abdomen: Soft, Nontender  Skin: No masses, erythema, lacerations, fluctation, ulcerations  Neurovascular: Gross motor and sensory exams are intact including sensation throughout the distal nerve root distributions of the right foot as well as good color and capillary refill.  He is actively moving his toes without significant difficulty.    MUSCULOSKELETAL EXAMINATION:    Exam today demonstrated the posterior splint in place.  The dressings were cut medially and lifted to expose the skin of the distal tibia and there is no significant swelling and no blistering.  Calf compartments are compressible and soft proximally.  He is able to actively flex and extend his knee without difficulty and flex and rotate his hip without difficulty.  He ambulates with crutches nonweightbearing right lower extremity without significant difficulty.  The left lower extremity exam is benign.      _____________________________________________________  STUDIES REVIEWED:  X-rays of his lower leg and ankle demonstrated a comminuted, intra-articular fracture of the distal right tibia without evidence of fracture involving the distal fibula or throughout the remainder of the fibular shaft.    X-ray reports were reviewed.    The ER note was reviewed.    The [facility] note  was reviewed.          Colton Abad DO